# Patient Record
Sex: FEMALE | Race: WHITE | NOT HISPANIC OR LATINO | URBAN - METROPOLITAN AREA
[De-identification: names, ages, dates, MRNs, and addresses within clinical notes are randomized per-mention and may not be internally consistent; named-entity substitution may affect disease eponyms.]

---

## 2023-08-09 ENCOUNTER — WEB ENCOUNTER (OUTPATIENT)
Dept: URBAN - METROPOLITAN AREA CLINIC 74 | Facility: CLINIC | Age: 48
End: 2023-08-09

## 2023-08-14 PROBLEM — 197321007: Status: ACTIVE | Noted: 2023-08-14

## 2023-08-14 PROBLEM — 123608004: Status: ACTIVE | Noted: 2023-08-14

## 2023-08-15 ENCOUNTER — LAB OUTSIDE AN ENCOUNTER (OUTPATIENT)
Dept: URBAN - METROPOLITAN AREA CLINIC 74 | Facility: CLINIC | Age: 48
End: 2023-08-15

## 2023-08-15 ENCOUNTER — WEB ENCOUNTER (OUTPATIENT)
Dept: URBAN - METROPOLITAN AREA CLINIC 74 | Facility: CLINIC | Age: 48
End: 2023-08-15

## 2023-08-15 ENCOUNTER — OFFICE VISIT (OUTPATIENT)
Dept: URBAN - METROPOLITAN AREA CLINIC 74 | Facility: CLINIC | Age: 48
End: 2023-08-15
Payer: COMMERCIAL

## 2023-08-15 VITALS
HEART RATE: 101 BPM | BODY MASS INDEX: 28.01 KG/M2 | TEMPERATURE: 98.1 F | WEIGHT: 206.8 LBS | HEIGHT: 72 IN | OXYGEN SATURATION: 95 % | DIASTOLIC BLOOD PRESSURE: 66 MMHG | SYSTOLIC BLOOD PRESSURE: 108 MMHG

## 2023-08-15 DIAGNOSIS — R10.13 EPIGASTRIC ABDOMINAL PAIN: ICD-10-CM

## 2023-08-15 DIAGNOSIS — R74.01 TRANSAMINITIS: ICD-10-CM

## 2023-08-15 DIAGNOSIS — R16.0 HEPATOMEGALY: ICD-10-CM

## 2023-08-15 DIAGNOSIS — Z79.1 LONG TERM CURRENT USE OF NON-STEROIDAL ANTI-INFLAMMATORIES (NSAID): ICD-10-CM

## 2023-08-15 DIAGNOSIS — K76.0 HEPATIC STEATOSIS: ICD-10-CM

## 2023-08-15 DIAGNOSIS — Z12.11 COLON CANCER SCREENING: ICD-10-CM

## 2023-08-15 DIAGNOSIS — Z83.71 FAMILY HISTORY OF COLONIC POLYPS: ICD-10-CM

## 2023-08-15 DIAGNOSIS — K83.8 DILATED BILE DUCT: ICD-10-CM

## 2023-08-15 DIAGNOSIS — R93.5 ABNORMAL ABDOMINAL CT SCAN: ICD-10-CM

## 2023-08-15 PROCEDURE — 99204 OFFICE O/P NEW MOD 45 MIN: CPT | Performed by: PHYSICIAN ASSISTANT

## 2023-08-15 RX ORDER — ONDANSETRON 4 MG/1
1 TABLET ON THE TONGUE AND ALLOW TO DISSOLVE TABLET, ORALLY DISINTEGRATING ORAL ONCE A DAY
Status: ACTIVE | COMMUNITY

## 2023-08-15 RX ORDER — DICLOFENAC SODIUM 50 MG/1
1 TABLET AS NEEDED TABLET, DELAYED RELEASE ORAL TWICE A DAY
Status: ACTIVE | COMMUNITY

## 2023-08-15 RX ORDER — GLIPIZIDE 5 MG/1
1 TABLET 30 MINUTES BEFORE BREAKFAST TABLET ORAL ONCE A DAY
Status: ON HOLD | COMMUNITY

## 2023-08-15 RX ORDER — GABAPENTIN 800 MG/1
1 TABLET TABLET, FILM COATED ORAL ONCE A DAY
Refills: 0 | Status: ACTIVE | COMMUNITY
Start: 1900-01-01

## 2023-08-15 RX ORDER — QUETIAPINE 100 MG/1
1 TABLET AT BEDTIME TABLET, FILM COATED ORAL ONCE A DAY
Status: ACTIVE | COMMUNITY

## 2023-08-15 RX ORDER — HALOPERIDOL 5 MG/ML
INJECTION INTRAMUSCULAR
Qty: 0 | Refills: 0 | Status: ON HOLD | COMMUNITY
Start: 1900-01-01

## 2023-08-15 RX ORDER — LEVETIRACETAM 500 MG/1
1 TABLET TABLET, FILM COATED ORAL
Status: ACTIVE | COMMUNITY

## 2023-08-15 RX ORDER — MORPHINE SULFATE 60 MG/1
1 TABLET TABLET ORAL
Status: ACTIVE | COMMUNITY

## 2023-08-15 RX ORDER — METHOCARBAMOL 500 MG/1
1.5 TABLETS TABLET ORAL
Status: ACTIVE | COMMUNITY

## 2023-08-15 RX ORDER — METHYLPREDNISOLONE 4 MG/1
AS DIRECTED TABLET ORAL
Status: ON HOLD | COMMUNITY

## 2023-08-15 RX ORDER — METFORMIN HYDROCHLORIDE 500 MG/1
1 TABLET WITH A MEAL TABLET, FILM COATED ORAL ONCE A DAY
Status: ACTIVE | COMMUNITY

## 2023-08-15 RX ORDER — POLYETHYLENE GLYCOL 3350, SODIUM SULFATE ANHYDROUS, SODIUM BICARBONATE, SODIUM CHLORIDE, POTASSIUM CHLORIDE 236; 22.74; 6.74; 5.86; 2.97 G/4L; G/4L; G/4L; G/4L; G/4L
AS DIRECTED FOR COLON CLEANSER POWDER, FOR SOLUTION ORAL
Qty: 4000 MILLILITER | Refills: 0 | OUTPATIENT
Start: 2023-08-15 | End: 2023-08-16

## 2023-08-15 RX ORDER — DULOXETINE 60 MG/1
1 CAPSULE CAPSULE, DELAYED RELEASE PELLETS ORAL ONCE A DAY
Status: ACTIVE | COMMUNITY

## 2023-08-15 RX ORDER — OXYCODONE HYDROCHLORIDE 20 MG/1
1 TABLET AS NEEDED TABLET ORAL
Status: ACTIVE | COMMUNITY

## 2023-08-15 NOTE — HPI-TODAY'S VISIT:
The patient is 48-year-old female with past medical history as noted below is presenting to our clinic today for evaluation of recurrent abdominal pain and elevated LFT's. New onset of DM with HgA1c 13.0. CT in 2022 with heaptomegaly and hepatic steatosis. Dilated biliary duct since 2021. She also here to schedule Colonoscopy. No family history of colon polyps. No colon cancer. She use to take IBuprofen in the past but she stopped taking it about a couple years ago. She complains of epigastric abdominal pain. She denies dyspepsia. She is not on PPI. She also has alternating bowel movement with chronic constipation. She is on chronic Opiod use. She has given Miralax and Linzess with no response. She admits to chronic abdominal pain and cramping. No other GI issues today.    -- The patient denies dyspepsia, dysphagia, odynophagia, hemoptysis, hematemesis, nausea, vomiting, regurgitation, melena, diarrhea, hematochezia, fever, chills, chest pain, SOB, or any other GI complaints today.  -- The patient denies ETOH, Tobaco, and Illicit drug use. She vapes yesterday. -- The patient is up to date with Flu and Pneumonia but not COVID vaccine. -- Denies NSAID's. History of Ibuprofen use.    Diagnostic studies: -- Labs on 05/13/2023 CMP with BUN 7, creatinine 0.4, , AST 57, ALT 73, and TP 0.2.  CBC with WBC 6.3, hemoglobin 12.6, hematocrit 35.6, and platelet 163.  Hemoglobin A1c 13.0.  -- CT on 08/26/2022 with mild hepatomegaly.  Mild hepatic asteatosis.  Status postcholecystectomy.  They pattern of mild intramedullary biliary dilation similar to 11/17/2021 and may be of chronic postsurgical changes.  Correlate with LFTs.  Paucity of formed stool throughout the right and transverse colon with air-fluid levels.  Bilateral nonobstructing nephrolithiasis.  No ureteral or bladder calculi.

## 2023-08-17 ENCOUNTER — TELEPHONE ENCOUNTER (OUTPATIENT)
Dept: URBAN - METROPOLITAN AREA CLINIC 63 | Facility: CLINIC | Age: 48
End: 2023-08-17

## 2023-08-23 LAB
% SATURATION: 14
A/G RATIO: 1.6
ABSOLUTE BASOPHILS: 32
ABSOLUTE EOSINOPHILS: 214
ABSOLUTE LYMPHOCYTES: 2199
ABSOLUTE MONOCYTES: 473
ABSOLUTE NEUTROPHILS: 3383
ACTIN (SMOOTH MUSCLE) ANTIBODY (IGG): <20
ALBUMIN: 4.5
ALKALINE PHOSPHATASE: 115
ALPHA-1-ANTITRYPSIN QN: 145
ALT (SGPT): 100
AMYLASE: 25
ANA SCREEN, IFA: NEGATIVE
AST (SGOT): 82
BASOPHILS: 0.5
BILIRUBIN, TOTAL: 0.3
BUN/CREATININE RATIO: (no result)
BUN: 10
CALCIUM: 9.6
CARBON DIOXIDE, TOTAL: 25
CERULOPLASMIN: 29
CHLORIDE: 98
CREATININE: 0.56
EGFR: 113
EOSINOPHILS: 3.4
FERRITIN: 79
GGT: 361
GLOBULIN, TOTAL: 2.9
GLUCOSE: 232
HBSAG SCREEN: (no result)
HEMATOCRIT: 38.4
HEMOGLOBIN: 12.2
HEP A AB, IGM: (no result)
HEP B CORE AB, IGM: (no result)
HEPATITIS C ANTIBODY: (no result)
HEREDITARY HEMOCHROMATOSIS DNA MUT: (no result)
IMMUNOGLOBULIN A, QN, SERUM: 257
INTERPRETATION: (no result)
IRON BINDING CAPACITY: 446
IRON, TOTAL: 64
LIPASE: 18
LYMPHOCYTES: 34.9
MCH: 28.5
MCHC: 31.8
MCV: 89.7
MITOCHONDRIAL (M2) ANTIBODY: <=20
MONOCYTES: 7.5
MPV: 11.8
NEUTROPHILS: 53.7
PLATELET COUNT: 143
POTASSIUM: 4.2
PROTEIN, TOTAL: 7.4
RDW: 13.5
RED BLOOD CELL COUNT: 4.28
RHEUMATOID FACTOR: <14
SJOGREN'S ANTIBODY (SS-A): (no result)
SJOGREN'S ANTIBODY (SS-B): (no result)
SODIUM: 133
T-TRANSGLUTAMINASE (TTG) IGA: <1
WHITE BLOOD CELL COUNT: 6.3

## 2023-10-10 ENCOUNTER — OFFICE VISIT (OUTPATIENT)
Dept: URBAN - METROPOLITAN AREA SURGERY CENTER 30 | Facility: SURGERY CENTER | Age: 48
End: 2023-10-10

## 2023-10-10 ENCOUNTER — TELEPHONE ENCOUNTER (OUTPATIENT)
Dept: URBAN - METROPOLITAN AREA CLINIC 40 | Facility: CLINIC | Age: 48
End: 2023-10-10

## 2023-11-07 ENCOUNTER — OFFICE VISIT (OUTPATIENT)
Dept: URBAN - METROPOLITAN AREA CLINIC 74 | Facility: CLINIC | Age: 48
End: 2023-11-07

## 2023-11-07 NOTE — HPI-TODAY'S VISIT:
The patient is 48-year-old female with past medical history as noted below known to Dr. Mccann is presenting to our clinic today to discuss her recent labs, imaging, and procedures.    -- The patient denies dyspepsia, dysphagia, odynophagia, hemoptysis, hematemesis, nausea, vomiting, regurgitation, melena, diarrhea, hematochezia, fever, chills, chest pain, SOB, or any other GI complaints today.  -- The patient denies ETOH, Tobaco, and Illicit drug use. She vapes daily. -- The patient is up to date with Flu and Pneumonia but not COVID vaccine. -- Denies NSAID's. History of Ibuprofen use.    Diagnostic studies: -- MRI/MRCP on 08/30/2023 with marked hepatic steatosis. Splenomegaly, increased from exam one year prior, which can be seen in the setting of portal venous hypertension. Main portal vein is patent. No ascites.  -- Labs on 08/15/2023 Amylase, Lipase, Alpha 1 Antitrypsin, AMA, ASMA, MELA, CBC, Fe panle, and HHMA are normal. CMP with glucose 232, , , AST 82, , and .   -- CT on 08/26/2022 with mild hepatomegaly.  Mild hepatic asteatosis.  Status postcholecystectomy.  They pattern of mild intramedullary biliary dilation similar to 11/17/2021 and may be of chronic postsurgical changes.  Correlate with LFTs.  Paucity of formed stool throughout the right and transverse colon with air-fluid levels.  Bilateral nonobstructing nephrolithiasis.  No ureteral or bladder calculi.

## 2024-01-05 ENCOUNTER — OFFICE VISIT (OUTPATIENT)
Dept: URBAN - METROPOLITAN AREA SURGERY CENTER 30 | Facility: SURGERY CENTER | Age: 49
End: 2024-01-05

## 2024-01-31 ENCOUNTER — OFFICE VISIT (OUTPATIENT)
Dept: URBAN - METROPOLITAN AREA CLINIC 74 | Facility: CLINIC | Age: 49
End: 2024-01-31

## 2024-04-30 ENCOUNTER — LAB (OUTPATIENT)
Dept: URBAN - METROPOLITAN AREA CLINIC 74 | Facility: CLINIC | Age: 49
End: 2024-04-30

## 2024-04-30 ENCOUNTER — OV EP (OUTPATIENT)
Dept: URBAN - METROPOLITAN AREA CLINIC 74 | Facility: CLINIC | Age: 49
End: 2024-04-30

## 2024-04-30 VITALS
DIASTOLIC BLOOD PRESSURE: 72 MMHG | HEART RATE: 97 BPM | BODY MASS INDEX: 32.59 KG/M2 | SYSTOLIC BLOOD PRESSURE: 110 MMHG | TEMPERATURE: 97.5 F | WEIGHT: 202.8 LBS | HEIGHT: 66 IN

## 2024-04-30 PROBLEM — 373621006: Status: ACTIVE | Noted: 2024-04-30

## 2024-04-30 PROBLEM — 34742003: Status: ACTIVE | Noted: 2024-04-30

## 2024-04-30 RX ORDER — METHYLNALTREXONE BROMIDE 150 MG/1
3 TABLETS 30 MINUTES BEFORE THE FIRST MEAL OF THE DAY TABLET ORAL ONCE A DAY
Qty: 270 | Refills: 3 | OUTPATIENT
Start: 2024-04-30 | End: 2025-04-25

## 2024-04-30 RX ORDER — MORPHINE SULFATE 60 MG/1
1 TABLET TABLET ORAL
Status: ACTIVE | COMMUNITY

## 2024-04-30 RX ORDER — DULOXETINE 60 MG/1
1 CAPSULE CAPSULE, DELAYED RELEASE PELLETS ORAL ONCE A DAY
Status: ACTIVE | COMMUNITY

## 2024-04-30 RX ORDER — HALOPERIDOL 5 MG/ML
INJECTION INTRAMUSCULAR
Qty: 0 | Refills: 0 | Status: ON HOLD | COMMUNITY
Start: 1900-01-01

## 2024-04-30 RX ORDER — OXYCODONE HYDROCHLORIDE 20 MG/1
1 TABLET AS NEEDED TABLET ORAL
Refills: 0 | Status: ACTIVE | COMMUNITY

## 2024-04-30 RX ORDER — GLIPIZIDE 5 MG/1
1 TABLET 30 MINUTES BEFORE BREAKFAST TABLET ORAL ONCE A DAY
Status: ON HOLD | COMMUNITY

## 2024-04-30 RX ORDER — METHYLPREDNISOLONE 4 MG/1
AS DIRECTED TABLET ORAL
Status: ON HOLD | COMMUNITY

## 2024-04-30 RX ORDER — ONDANSETRON 4 MG/1
1 TABLET ON THE TONGUE AND ALLOW TO DISSOLVE TABLET, ORALLY DISINTEGRATING ORAL ONCE A DAY
Status: ON HOLD | COMMUNITY

## 2024-04-30 RX ORDER — QUETIAPINE 100 MG/1
1 TABLET AT BEDTIME TABLET, FILM COATED ORAL ONCE A DAY
Status: ACTIVE | COMMUNITY

## 2024-04-30 RX ORDER — LEVETIRACETAM 500 MG/1
1 TABLET TABLET, FILM COATED ORAL
Status: ACTIVE | COMMUNITY

## 2024-04-30 RX ORDER — DICLOFENAC SODIUM 50 MG/1
1 TABLET AS NEEDED TABLET, DELAYED RELEASE ORAL TWICE A DAY
Status: ON HOLD | COMMUNITY

## 2024-04-30 RX ORDER — METHOCARBAMOL 500 MG/1
1.5 TABLETS TABLET ORAL
Status: ACTIVE | COMMUNITY

## 2024-04-30 RX ORDER — GABAPENTIN 800 MG/1
1 TABLET TABLET, FILM COATED ORAL ONCE A DAY
Refills: 0 | Status: ACTIVE | COMMUNITY
Start: 1900-01-01

## 2024-04-30 RX ORDER — METFORMIN HYDROCHLORIDE 500 MG/1
1 TABLET WITH A MEAL TABLET, FILM COATED ORAL ONCE A DAY
Status: ACTIVE | COMMUNITY

## 2024-04-30 NOTE — HPI-TODAY'S VISIT:
The patient is 48-year-old female with past medical history as noted below known to Dr. Mccann is presenting to our clinic today to discuss elevated LFT's. She was seen by Dr. Mccann in 10/2023 and was scheduled for EGD and Colonoscopy but she failed to complete her pocedures. [facility] labs in 2023 unremarakble. The patient is Tylenol 500 mg every 6 hours. She is on Oxycodone and Morphine with poor response. She denies any GI issues except constipation 2/2 to pain medications.    -- The patient denies dyspepsia, dysphagia, odynophagia, hemoptysis, hematemesis, nausea, vomiting, regurgitation, melena, diarrhea, hematochezia, fever, chills, chest pain, SOB, or any other GI complaints today.  -- The patient denies ETOH, Tobaco, and Illicit drug use. She vapes daily. -- The patient is up to date with Flu and Pneumonia but not COVID vaccine. -- Denies NSAID's. History of Ibuprofen use.    Diagnostic studies: -- Labs on 04/18/2024 CMP with BUN 11, creatinine 0.6, , , , and TB 8.3.  CBC with WBC 13.4, hemoglobin 12.4, hematocrit 38.3, and platelet 184.  -- MRI/MRCP on 08/30/2023 with marked hepatic steatosis. Splenomegaly, increased from exam one year prior, which can be seen in the setting of portal venous hypertension. Main portal vein is patent. No ascites.  -- Labs on 08/15/2023 Amylase, Lipase, Alpha 1 Antitrypsin, AMA, ASMA, MELA, CBC, Fe panle, and HHMA are normal. CMP with glucose 232, , , AST 82, , and .   -- CT on 08/26/2022 with mild hepatomegaly.  Mild hepatic asteatosis.  Status postcholecystectomy.  They pattern of mild intramedullary biliary dilation similar to 11/17/2021 and may be of chronic postsurgical changes.  Correlate with LFTs.  Paucity of formed stool throughout the right and transverse colon with air-fluid levels.  Bilateral nonobstructing nephrolithiasis.  No ureteral or bladder calculi.

## 2024-05-01 ENCOUNTER — TELEPHONE ENCOUNTER (OUTPATIENT)
Dept: URBAN - METROPOLITAN AREA CLINIC 74 | Facility: CLINIC | Age: 49
End: 2024-05-01

## 2024-05-08 ENCOUNTER — TELEPHONE ENCOUNTER (OUTPATIENT)
Dept: URBAN - METROPOLITAN AREA CLINIC 74 | Facility: CLINIC | Age: 49
End: 2024-05-08

## 2024-05-08 RX ORDER — NALOXEGOL OXALATE 25 MG/1
1 TABLET IN THE MORNING TABLET, FILM COATED ORAL ONCE A DAY
Qty: 90 TABLET | Refills: 3 | OUTPATIENT
Start: 2024-05-10 | End: 2025-05-05

## 2024-05-13 ENCOUNTER — TELEPHONE ENCOUNTER (OUTPATIENT)
Dept: URBAN - METROPOLITAN AREA CLINIC 74 | Facility: CLINIC | Age: 49
End: 2024-05-13

## 2024-05-30 ENCOUNTER — DASHBOARD ENCOUNTERS (OUTPATIENT)
Age: 49
End: 2024-05-30

## 2024-05-30 ENCOUNTER — TELEPHONE ENCOUNTER (OUTPATIENT)
Dept: URBAN - METROPOLITAN AREA CLINIC 74 | Facility: CLINIC | Age: 49
End: 2024-05-30

## 2024-06-06 ENCOUNTER — OFFICE VISIT (OUTPATIENT)
Dept: URBAN - METROPOLITAN AREA CLINIC 74 | Facility: CLINIC | Age: 49
End: 2024-06-06

## 2024-06-06 NOTE — HPI-TODAY'S VISIT:
The patient is 48-year-old female with past medical history as noted below known to Dr. Mccann is presenting to our clinic today to discuss elevated LFT's. She was seen by Dr. Mccann in 10/2023 and was scheduled for EGD and Colonoscopy but she failed to complete her pocedures. [facility] labs in 2023 unremarakble. The patient is Tylenol 500 mg every 6 hours. She is on Oxycodone and Morphine with poor response. She denies any GI issues except constipation 2/2 to pain medications.     -- The patient denies ETOH, Tobaco, and Illicit drug use. She vapes daily. -- The patient is up to date with Flu and Pneumonia but not COVID vaccine. -- Denies NSAID's. History of Ibuprofen use.    Diagnostic studies: -- Labs on 04/18/2024 CMP with BUN 11, creatinine 0.6, , , , and TB 8.3.  CBC with WBC 13.4, hemoglobin 12.4, hematocrit 38.3, and platelet 184.  -- MRI/MRCP on 08/30/2023 with marked hepatic steatosis. Splenomegaly, increased from exam one year prior, which can be seen in the setting of portal venous hypertension. Main portal vein is patent. No ascites.  -- Labs on 08/15/2023 Amylase, Lipase, Alpha 1 Antitrypsin, AMA, ASMA, MELA, CBC, Fe panle, and HHMA are normal. CMP with glucose 232, , , AST 82, , and .   -- CT on 08/26/2022 with mild hepatomegaly.  Mild hepatic asteatosis.  Status postcholecystectomy.  They pattern of mild intramedullary biliary dilation similar to 11/17/2021 and may be of chronic postsurgical changes.  Correlate with LFTs.  Paucity of formed stool throughout the right and transverse colon with air-fluid levels.  Bilateral nonobstructing nephrolithiasis.  No ureteral or bladder calculi.

## 2024-06-24 ENCOUNTER — TELEPHONE ENCOUNTER (OUTPATIENT)
Dept: URBAN - METROPOLITAN AREA CLINIC 74 | Facility: CLINIC | Age: 49
End: 2024-06-24

## 2024-07-02 ENCOUNTER — OFFICE VISIT (OUTPATIENT)
Dept: URBAN - METROPOLITAN AREA CLINIC 74 | Facility: CLINIC | Age: 49
End: 2024-07-02

## 2024-07-02 NOTE — HPI-TODAY'S VISIT:
The patient is 48-year-old female with past medical history as noted below known to Dr. Mccann is presenting to our clinic today to discuss labs and MR Elastography.  She was seen by Dr. Mccann in 10/2023 and was scheduled for EGD and Colonoscopy but she failed to complete her pocedures. [facility] labs in 2023 unremarakble.   Diagnostic studies: -- Labs on 04/18/2024 CMP with BUN 11, creatinine 0.6, , , , and TB 8.3.  CBC with WBC 13.4, hemoglobin 12.4, hematocrit 38.3, and platelet 184.  -- MRI/MRCP on 08/30/2023 with marked hepatic steatosis. Splenomegaly, increased from exam one year prior, which can be seen in the setting of portal venous hypertension. Main portal vein is patent. No ascites.  -- Labs on 08/15/2023 Amylase, Lipase, Alpha 1 Antitrypsin, AMA, ASMA, MELA, CBC, Fe panle, and HHMA are normal. CMP with glucose 232, , , AST 82, , and .   -- CT on 08/26/2022 with mild hepatomegaly.  Mild hepatic asteatosis.  Status postcholecystectomy.  They pattern of mild intramedullary biliary dilation similar to 11/17/2021 and may be of chronic postsurgical changes.  Correlate with LFTs.  Paucity of formed stool throughout the right and transverse colon with air-fluid levels.  Bilateral nonobstructing nephrolithiasis.  No ureteral or bladder calculi.